# Patient Record
Sex: FEMALE | Race: WHITE | NOT HISPANIC OR LATINO | Employment: UNEMPLOYED | URBAN - NONMETROPOLITAN AREA
[De-identification: names, ages, dates, MRNs, and addresses within clinical notes are randomized per-mention and may not be internally consistent; named-entity substitution may affect disease eponyms.]

---

## 2023-10-11 LAB — PAP RECOMMENDATION EXT: NORMAL

## 2024-01-18 ENCOUNTER — OFFICE VISIT (OUTPATIENT)
Dept: FAMILY MEDICINE | Facility: CLINIC | Age: 29
End: 2024-01-18
Payer: COMMERCIAL

## 2024-01-18 VITALS
HEART RATE: 77 BPM | TEMPERATURE: 99 F | OXYGEN SATURATION: 97 % | RESPIRATION RATE: 20 BRPM | BODY MASS INDEX: 23.32 KG/M2 | SYSTOLIC BLOOD PRESSURE: 120 MMHG | WEIGHT: 118.81 LBS | HEIGHT: 60 IN | DIASTOLIC BLOOD PRESSURE: 78 MMHG

## 2024-01-18 DIAGNOSIS — R59.9 SWOLLEN LYMPH NODES: ICD-10-CM

## 2024-01-18 DIAGNOSIS — H65.192 OTHER NON-RECURRENT ACUTE NONSUPPURATIVE OTITIS MEDIA OF LEFT EAR: Primary | ICD-10-CM

## 2024-01-18 DIAGNOSIS — B37.31 VAGINAL CANDIDIASIS: ICD-10-CM

## 2024-01-18 DIAGNOSIS — H65.93 MIDDLE EAR EFFUSION, BILATERAL: ICD-10-CM

## 2024-01-18 LAB
CTP QC/QA: YES
FLUAV AG NPH QL: NEGATIVE
FLUBV AG NPH QL: NEGATIVE
S PYO RRNA THROAT QL PROBE: NEGATIVE
SARS-COV-2 AG RESP QL IA.RAPID: NEGATIVE

## 2024-01-18 PROCEDURE — 87880 STREP A ASSAY W/OPTIC: CPT | Mod: QW,,, | Performed by: NURSE PRACTITIONER

## 2024-01-18 PROCEDURE — 3008F BODY MASS INDEX DOCD: CPT | Mod: ,,, | Performed by: NURSE PRACTITIONER

## 2024-01-18 PROCEDURE — 87426 SARSCOV CORONAVIRUS AG IA: CPT | Mod: QW,,, | Performed by: NURSE PRACTITIONER

## 2024-01-18 PROCEDURE — 87804 INFLUENZA ASSAY W/OPTIC: CPT | Mod: QW,,, | Performed by: NURSE PRACTITIONER

## 2024-01-18 PROCEDURE — 3074F SYST BP LT 130 MM HG: CPT | Mod: ,,, | Performed by: NURSE PRACTITIONER

## 2024-01-18 PROCEDURE — 3078F DIAST BP <80 MM HG: CPT | Mod: ,,, | Performed by: NURSE PRACTITIONER

## 2024-01-18 PROCEDURE — 99214 OFFICE O/P EST MOD 30 MIN: CPT | Mod: ,,, | Performed by: NURSE PRACTITIONER

## 2024-01-18 PROCEDURE — 1159F MED LIST DOCD IN RCRD: CPT | Mod: ,,, | Performed by: NURSE PRACTITIONER

## 2024-01-18 PROCEDURE — 1160F RVW MEDS BY RX/DR IN RCRD: CPT | Mod: ,,, | Performed by: NURSE PRACTITIONER

## 2024-01-18 RX ORDER — AMOXICILLIN 500 MG/1
500 TABLET, FILM COATED ORAL EVERY 12 HOURS
Qty: 20 TABLET | Refills: 0 | Status: SHIPPED | OUTPATIENT
Start: 2024-01-18 | End: 2024-01-28

## 2024-01-18 RX ORDER — FLUCONAZOLE 150 MG/1
150 TABLET ORAL DAILY
Qty: 1 TABLET | Refills: 0 | Status: SHIPPED | OUTPATIENT
Start: 2024-01-18 | End: 2024-01-19

## 2024-01-18 NOTE — ASSESSMENT & PLAN NOTE
Amoxicillin prescribed to take as directed. Instructed to take all abx until gone even if start to feel better.    Instructed may alternate Tylenol/Motrin for pain/fever if not contraindicated.    Instructed to RTC for any new/worsening/persisting ssx.

## 2024-01-18 NOTE — PROGRESS NOTES
"   LOUISA Elaine   [facility]  [address]  [place] [address]     PATIENT NAME: Lianne Severino  : 1995  DATE: 24  MRN: [id number]      Billing Provider: LOUISA Elaine  Level of Service: MO OFFICE/OUTPT VISIT, EST, LEVL IV, 30-39 MIN  Patient PCP Information       Provider PCP Type    LOUISA [facility]            Reason for Visit / Chief Complaint: Adenopathy (Started this morning ), Headache, Otalgia, and Diarrhea   Health Maintenance Due   Topic Date Due    Hepatitis C Screening  Never done    COVID-19 Vaccine (1) Never done    Pneumococcal Vaccines (Age 0-64) (1 - PCV) Never done    HPV Vaccines (1 - 2-dose series) Never done    TETANUS VACCINE  Never done    Pap Smear  2023          History of Present Illness / Problem Focused Workflow     Lianne Severino presents to the clinic with swollen lymph nodes under neck, earaches (more left than right), headaches, and slight diarrhea. She denies fever, cough, sore throat, abd pain, n/v. She has taken Tylenol this AM that has helped. Provider asked about pt's foot as there was a MRI order placed in December but does not look like pt had done. She states her foot actually started feeling better when she left clinic so she did not have MRI done.  She denies any other needs at this time. NAD noted.     No results found for: "HGBA1C"     CMP  Sodium   Date Value Ref Range Status   2023 136 136 - 145 mmol/L Final     Potassium   Date Value Ref Range Status   2023 4.9 3.5 - 5.1 mmol/L Final     Chloride   Date Value Ref Range Status   2023 104 98 - 107 mmol/L Final     CO2   Date Value Ref Range Status   2023 27 21 - 32 mmol/L Final     Glucose   Date Value Ref Range Status   2023 91 74 - 106 mg/dL Final     BUN   Date Value Ref Range Status   2023 13 7 - 18 mg/dL Final     Creatinine   Date Value Ref Range Status   2023 0.73 0.55 - 1.02 mg/dL Final     Calcium   Date Value Ref Range " Status   08/23/2023 8.9 8.5 - 10.1 mg/dL Final     Anion Gap   Date Value Ref Range Status   08/23/2023 10 7 - 16 mmol/L Final     eGFR   Date Value Ref Range Status   08/23/2023 116 >=60 mL/min/1.73m2 Final        Lab Results   Component Value Date    WBC 8.04 08/23/2023    RBC 4.43 08/23/2023    HGB 14.5 08/23/2023    HCT 42.1 08/23/2023    MCV 95.0 08/23/2023    MCH 32.7 (H) 08/23/2023    MCHC 34.4 08/23/2023    RDW 11.8 08/23/2023     08/23/2023    MPV 9.4 08/23/2023    LYMPH 34.3 08/23/2023    LYMPH 2.76 08/23/2023    MONO 5.6 08/23/2023    EOS 0.28 08/23/2023    BASO 0.04 08/23/2023    EOSINOPHIL 3.5 08/23/2023    BASOPHIL 0.5 08/23/2023        Lab Results   Component Value Date    CHOL 156 08/23/2023     Lab Results   Component Value Date    HDL 63 (H) 08/23/2023     Lab Results   Component Value Date    LDLCALC 77 08/23/2023     Lab Results   Component Value Date    TRIG 79 08/23/2023     Lab Results   Component Value Date    CHOLHDL 2.5 08/23/2023        Wt Readings from Last 3 Encounters:   01/18/24 1605 53.9 kg (118 lb 12.8 oz)   12/04/23 1601 52.8 kg (116 lb 6.4 oz)   11/14/23 0849 52 kg (114 lb 9.6 oz)        BP Readings from Last 3 Encounters:   01/18/24 120/78   12/04/23 117/79   11/14/23 107/72        Review of Systems     Review of Systems   Constitutional: Negative.    HENT:  Positive for ear pain. Negative for nasal congestion, ear discharge, postnasal drip, rhinorrhea, sinus pressure/congestion and sore throat.    Eyes: Negative.    Respiratory: Negative.     Cardiovascular: Negative.    Gastrointestinal:  Positive for diarrhea.   Endocrine: Negative.    Genitourinary: Negative.    Musculoskeletal: Negative.    Integumentary:  Negative.   Allergic/Immunologic: Negative.    Neurological:  Positive for headaches.   Hematological: Negative.    Psychiatric/Behavioral: Negative.          Medical / Social / Family History     Past Medical History:   Diagnosis Date    Foot injury, left, initial  encounter 11/14/2023    Left foot pain 11/14/2023    She fell off of the porch and wristed the top of her foot whens he fell.   Xray ordered. Pending results.   Rest, Ice, Compression, Elevation  IBPROFEN 800mg as needed  Wrapped foot for compression with ace wrap        Past Surgical History:   Procedure Laterality Date    EXCISION OF LESION OF EYELID      stye removal       Social History  Ms.  reports that she has been smoking vaping with nicotine. She has never been exposed to tobacco smoke. She has never used smokeless tobacco. She reports that she does not currently use alcohol after a past usage of about 1.0 standard drink of alcohol per week. She reports that she does not use drugs.    Family History  Ms.'s family history includes COPD in her maternal grandmother; Heart attack in her maternal uncle; Hypertension in her father and mother; Lung cancer in her maternal grandfather and paternal grandfather; No Known Problems in her paternal grandmother.    Medications and Allergies     Medications  Outpatient Medications Marked as Taking for the 1/18/24 encounter (Office Visit) with Ekta Yuen FNP   Medication Sig Dispense Refill    FLUoxetine 20 MG capsule Take 1 capsule (20 mg total) by mouth once daily. 90 capsule 1    multivitamin capsule Take 1 capsule by mouth once daily.         Allergies  Review of patient's allergies indicates:   Allergen Reactions    Clindamycin Nausea And Vomiting       Physical Examination     Vitals:    01/18/24 1605   BP: 120/78   BP Location: Left arm   Patient Position: Sitting   BP Method: Medium (Automatic)   Pulse: 77   Resp: 20   Temp: 98.5 °F (36.9 °C)   TempSrc: Oral   SpO2: 97%   Weight: 53.9 kg (118 lb 12.8 oz)   Height: 5' (1.524 m)      Physical Exam  Constitutional:       Appearance: Normal appearance.   HENT:      Head: Normocephalic.      Right Ear: A middle ear effusion is present.      Left Ear: A middle ear effusion is present. Tympanic membrane is erythematous.    Eyes:      Extraocular Movements: Extraocular movements intact.   Cardiovascular:      Rate and Rhythm: Normal rate and regular rhythm.      Pulses: Normal pulses.      Heart sounds: Normal heart sounds.   Pulmonary:      Effort: Pulmonary effort is normal.      Breath sounds: Normal breath sounds.   Abdominal:      General: Abdomen is flat. Bowel sounds are normal.      Palpations: Abdomen is soft.   Lymphadenopathy:      Head:      Right side of head: Submandibular adenopathy present.      Left side of head: Submandibular adenopathy present.   Skin:     General: Skin is warm and dry.      Capillary Refill: Capillary refill takes less than 2 seconds.   Neurological:      General: No focal deficit present.      Mental Status: She is alert and oriented to person, place, and time.   Psychiatric:         Mood and Affect: Mood normal.         Behavior: Behavior normal.          Assessment and Plan (including Health Maintenance)   :    Plan:     There are no Patient Instructions on file for this visit.       Health Maintenance Due   Topic Date Due    Hepatitis C Screening  Never done    COVID-19 Vaccine (1) Never done    Pneumococcal Vaccines (Age 0-64) (1 - PCV) Never done    HPV Vaccines (1 - 2-dose series) Never done    TETANUS VACCINE  Never done    Pap Smear  09/23/2023       Problem List Items Addressed This Visit       Left otitis media - Primary    Current Assessment & Plan     Amoxicillin prescribed to take as directed. Instructed to take all abx until gone even if start to feel better.    Instructed may alternate Tylenol/Motrin for pain/fever if not contraindicated.    Instructed to RTC for any new/worsening/persisting ssx.           Relevant Medications    amoxicillin (AMOXIL) 500 MG Tab    Middle ear effusion, bilateral    Current Assessment & Plan     Provider offered steroid injection but pt states she has had this before and made her heart race so she declines this today.          Swollen lymph nodes     Current Assessment & Plan     Flu, COVID, Strep, negative.   See above plan.   Instructed may alternate Tylenol/Motrin for pain/fever if not contraindicated.    Reassured pt lymph node swelling will be better after completing abx. Pt vu.   Instructed to RTC for any new/worsening/persisting ssx.             Relevant Orders    SARS Coronavirus 2 Antigen, POCT (Completed)    POCT Influenza A/B Rapid Antigen (Completed)    POCT rapid strep A (Completed)    Vaginal candidiasis    Current Assessment & Plan     Pt states she has had yeast infection with abx before and request Diflucan sent in as well.          Relevant Medications    fluconazole (DIFLUCAN) 150 MG Tab     Other non-recurrent acute nonsuppurative otitis media of left ear  -     amoxicillin (AMOXIL) 500 MG Tab; Take 1 tablet (500 mg total) by mouth every 12 (twelve) hours. for 10 days  Dispense: 20 tablet; Refill: 0    Swollen lymph nodes  -     SARS Coronavirus 2 Antigen, POCT  -     POCT Influenza A/B Rapid Antigen  -     POCT rapid strep A    Vaginal candidiasis  -     fluconazole (DIFLUCAN) 150 MG Tab; Take 1 tablet (150 mg total) by mouth once daily. for 1 day  Dispense: 1 tablet; Refill: 0    Middle ear effusion, bilateral       The patient has no Health Maintenance topics of status Not Due      Future Appointments   Date Time Provider Department Center   4/25/2024  9[facility]20 [facility]   8/29/2024 10:40 [facility]        Follow up if symptoms worsen or fail to improve.    Health Maintenance Due   Topic Date Due    Hepatitis C Screening  Never done    COVID-19 Vaccine (1) Never done    Pneumococcal Vaccines (Age 0-64) (1 - PCV) Never done    HPV Vaccines (1 - 2-dose series) Never done    TETANUS VACCINE  Never done    Pap Smear  09/23/2023        Signature:  LOUISA Elaine    Date of encounter: 1/18/24

## 2024-01-18 NOTE — ASSESSMENT & PLAN NOTE
Flu, COVID, Strep, negative.   See above plan.   Instructed may alternate Tylenol/Motrin for pain/fever if not contraindicated.    Reassured pt lymph node swelling will be better after completing abx. Pt vu.   Instructed to RTC for any new/worsening/persisting ssx.

## 2024-01-18 NOTE — ASSESSMENT & PLAN NOTE
Provider offered steroid injection but pt states she has had this before and made her heart race so she declines this today.

## 2024-02-06 ENCOUNTER — HOSPITAL ENCOUNTER (EMERGENCY)
Facility: HOSPITAL | Age: 29
Discharge: HOME | End: 2024-02-06
Payer: COMMERCIAL

## 2024-02-06 VITALS
RESPIRATION RATE: 17 BRPM | TEMPERATURE: 99 F | OXYGEN SATURATION: 100 % | HEIGHT: 60 IN | DIASTOLIC BLOOD PRESSURE: 55 MMHG | SYSTOLIC BLOOD PRESSURE: 99 MMHG | BODY MASS INDEX: 22.58 KG/M2 | WEIGHT: 115 LBS | HEART RATE: 87 BPM

## 2024-02-06 DIAGNOSIS — K52.9 GASTROENTERITIS: Primary | ICD-10-CM

## 2024-02-06 LAB
ALBUMIN SERPL BCP-MCNC: 3.8 G/DL (ref 3.5–5)
ALBUMIN/GLOB SERPL: 1 {RATIO}
ALP SERPL-CCNC: 90 U/L (ref 37–98)
ALT SERPL W P-5'-P-CCNC: 44 U/L (ref 13–56)
ANION GAP SERPL CALCULATED.3IONS-SCNC: 13 MMOL/L (ref 7–16)
AST SERPL W P-5'-P-CCNC: 34 U/L (ref 15–37)
BASOPHILS # BLD AUTO: 0 K/UL (ref 0–0.2)
BASOPHILS NFR BLD AUTO: 0 % (ref 0–1)
BILIRUB SERPL-MCNC: 0.5 MG/DL (ref ?–1.2)
BUN SERPL-MCNC: 13 MG/DL (ref 7–18)
BUN/CREAT SERPL: 17 (ref 6–20)
CALCIUM SERPL-MCNC: 8.8 MG/DL (ref 8.5–10.1)
CHLORIDE SERPL-SCNC: 101 MMOL/L (ref 98–107)
CO2 SERPL-SCNC: 26 MMOL/L (ref 21–32)
CREAT SERPL-MCNC: 0.75 MG/DL (ref 0.55–1.02)
DIFFERENTIAL METHOD BLD: ABNORMAL
EGFR (NO RACE VARIABLE) (RUSH/TITUS): 113 ML/MIN/1.73M2
EOSINOPHIL # BLD AUTO: 0.07 K/UL (ref 0–0.5)
EOSINOPHIL NFR BLD AUTO: 1.4 % (ref 1–4)
ERYTHROCYTE [DISTWIDTH] IN BLOOD BY AUTOMATED COUNT: 12.2 % (ref 11.5–14.5)
FLUAV AG UPPER RESP QL IA.RAPID: NEGATIVE
FLUBV AG UPPER RESP QL IA.RAPID: NEGATIVE
GLOBULIN SER-MCNC: 3.7 G/DL (ref 2–4)
GLUCOSE SERPL-MCNC: 90 MG/DL (ref 74–106)
HCT VFR BLD AUTO: 43.3 % (ref 38–47)
HGB BLD-MCNC: 14.3 G/DL (ref 12–16)
LACTATE SERPL-SCNC: 0.6 MMOL/L (ref 0.4–2)
LYMPHOCYTES # BLD AUTO: 0.47 K/UL (ref 1–4.8)
LYMPHOCYTES NFR BLD AUTO: 9.7 % (ref 27–41)
MCH RBC QN AUTO: 31.4 PG (ref 27–31)
MCHC RBC AUTO-ENTMCNC: 33 G/DL (ref 32–36)
MCV RBC AUTO: 95.2 FL (ref 80–96)
MONOCYTES # BLD AUTO: 0.2 K/UL (ref 0–0.8)
MONOCYTES NFR BLD AUTO: 4.1 % (ref 2–6)
MPC BLD CALC-MCNC: 9 FL (ref 9.4–12.4)
NEUTROPHILS # BLD AUTO: 4.09 K/UL (ref 1.8–7.7)
NEUTROPHILS NFR BLD AUTO: 84.8 % (ref 53–65)
PLATELET # BLD AUTO: 204 K/UL (ref 150–400)
POTASSIUM SERPL-SCNC: 3.4 MMOL/L (ref 3.5–5.1)
PROT SERPL-MCNC: 7.5 G/DL (ref 6.4–8.2)
RBC # BLD AUTO: 4.55 M/UL (ref 4.2–5.4)
SARS-COV-2 RDRP RESP QL NAA+PROBE: NEGATIVE
SODIUM SERPL-SCNC: 137 MMOL/L (ref 136–145)
WBC # BLD AUTO: 4.83 K/UL (ref 4.5–11)

## 2024-02-06 PROCEDURE — 96374 THER/PROPH/DIAG INJ IV PUSH: CPT

## 2024-02-06 PROCEDURE — 85025 COMPLETE CBC W/AUTO DIFF WBC: CPT | Performed by: NURSE PRACTITIONER

## 2024-02-06 PROCEDURE — 87804 INFLUENZA ASSAY W/OPTIC: CPT | Performed by: NURSE PRACTITIONER

## 2024-02-06 PROCEDURE — 99284 EMERGENCY DEPT VISIT MOD MDM: CPT | Mod: 25

## 2024-02-06 PROCEDURE — 99284 EMERGENCY DEPT VISIT MOD MDM: CPT | Mod: ,,, | Performed by: NURSE PRACTITIONER

## 2024-02-06 PROCEDURE — 83605 ASSAY OF LACTIC ACID: CPT | Performed by: NURSE PRACTITIONER

## 2024-02-06 PROCEDURE — 87635 SARS-COV-2 COVID-19 AMP PRB: CPT | Performed by: NURSE PRACTITIONER

## 2024-02-06 PROCEDURE — 80053 COMPREHEN METABOLIC PANEL: CPT | Performed by: NURSE PRACTITIONER

## 2024-02-06 PROCEDURE — 96361 HYDRATE IV INFUSION ADD-ON: CPT

## 2024-02-06 PROCEDURE — 63600175 PHARM REV CODE 636 W HCPCS: Performed by: NURSE PRACTITIONER

## 2024-02-06 PROCEDURE — 25000003 PHARM REV CODE 250: Performed by: NURSE PRACTITIONER

## 2024-02-06 RX ORDER — ONDANSETRON 4 MG/1
4 TABLET, ORALLY DISINTEGRATING ORAL EVERY 6 HOURS PRN
Qty: 20 TABLET | Refills: 0 | Status: SHIPPED | OUTPATIENT
Start: 2024-02-06

## 2024-02-06 RX ORDER — ONDANSETRON HYDROCHLORIDE 2 MG/ML
4 INJECTION, SOLUTION INTRAVENOUS
Status: COMPLETED | OUTPATIENT
Start: 2024-02-06 | End: 2024-02-06

## 2024-02-06 RX ADMIN — ONDANSETRON 4 MG: 2 INJECTION INTRAMUSCULAR; INTRAVENOUS at 12:02

## 2024-02-06 RX ADMIN — SODIUM CHLORIDE 1000 ML: 9 INJECTION, SOLUTION INTRAVENOUS at 12:02

## 2024-02-06 NOTE — ED PROVIDER NOTES
Encounter Date: 2/6/2024       History     Chief Complaint   Patient presents with    Influenza    COVID-19 Concerns     29 y/o WF arrived to the ED via POV with complaint of feeling feverish, headache, nausea, vomiting and diarrhea that started yesterday. Vomited x 3 yesterday, none today, but continues to be nauseated today. Had too numerous to count loose watery stools since yesterday. Denies blood in bm. Feels weak and dizzy today. Denies abdominal pain or dysuria. Has been able to keep down liquids today. Took Tylenol and Imodium AD OTC this morning at 08:00 am which has helped to slow down diarrhea, but has not resolved.   Denies sick contacts.    The history is provided by the patient.     Review of patient's allergies indicates:   Allergen Reactions    Clindamycin Nausea And Vomiting     Past Medical History:   Diagnosis Date    Foot injury, left, initial encounter 11/14/2023    Left foot pain 11/14/2023    She fell off of the porch and wristed the top of her foot whens he fell.   Xray ordered. Pending results.   Rest, Ice, Compression, Elevation  IBPROFEN 800mg as needed  Wrapped foot for compression with ace wrap      Past Surgical History:   Procedure Laterality Date    EXCISION OF LESION OF EYELID      stye removal     Family History   Problem Relation Age of Onset    Hypertension Mother     Hypertension Father     Heart attack Maternal Uncle     COPD Maternal Grandmother     Lung cancer Maternal Grandfather     No Known Problems Paternal Grandmother     Lung cancer Paternal Grandfather      Social History     Tobacco Use    Smoking status: Every Day     Types: Vaping with nicotine     Passive exposure: Never    Smokeless tobacco: Never   Substance Use Topics    Alcohol use: Not Currently     Alcohol/week: 1.0 standard drink of alcohol     Types: 1 Glasses of wine per week     Comment: rarely    Drug use: Never     Review of Systems   Constitutional:  Positive for appetite change, chills, fatigue and  fever. Negative for activity change.   HENT: Negative.     Eyes: Negative.    Respiratory: Negative.     Cardiovascular: Negative.    Gastrointestinal:  Positive for diarrhea, nausea and vomiting. Negative for abdominal pain and constipation.   Genitourinary:  Negative for dysuria, frequency and hematuria.   Musculoskeletal: Negative.    Skin: Negative.    Neurological:  Positive for dizziness and weakness. Negative for syncope, light-headedness and headaches.   Hematological: Negative.    Psychiatric/Behavioral: Negative.         Physical Exam     Initial Vitals [02/06/24 1155]   BP Pulse Resp Temp SpO2   124/76 102 16 98.5 °F (36.9 °C) 96 %      MAP       --         Physical Exam    Nursing note and vitals reviewed.  Constitutional: She appears well-developed and well-nourished. She is not diaphoretic. She is cooperative.  Non-toxic appearance. She does not have a sickly appearance. She appears ill. No distress.   HENT:   Head: Normocephalic and atraumatic.   Right Ear: External ear normal.   Left Ear: External ear normal.   Nose: Nose normal.   Mouth/Throat: Uvula is midline and oropharynx is clear and moist. Mucous membranes are dry.   Eyes: Pupils are equal, round, and reactive to light.   Neck: Neck supple.   Normal range of motion.  Cardiovascular:  Regular rhythm, normal heart sounds, intact distal pulses and normal pulses.   Tachycardia present.         Pulmonary/Chest: Effort normal and breath sounds normal.   Abdominal: Abdomen is soft and flat. Bowel sounds are normal. There is no abdominal tenderness.   Musculoskeletal:         General: Normal range of motion.      Cervical back: Normal range of motion and neck supple.     Lymphadenopathy:     She has no cervical adenopathy.   Neurological: She is alert and oriented to person, place, and time. She has normal strength.   Skin: Skin is warm, dry and intact. Capillary refill takes less than 2 seconds. No rash noted.   Psychiatric: She has a normal mood and  affect. Her speech is normal and behavior is normal. Judgment and thought content normal.         Medical Screening Exam   See Full Note    ED Course   Procedures  Labs Reviewed   COMPREHENSIVE METABOLIC PANEL - Abnormal; Notable for the following components:       Result Value    Potassium 3.4 (*)     All other components within normal limits   CBC WITH DIFFERENTIAL - Abnormal; Notable for the following components:    MCH 31.4 (*)     MPV 9.0 (*)     Neutrophils % 84.8 (*)     Lymphocytes % 9.7 (*)     Lymphocytes, Absolute 0.47 (*)     All other components within normal limits   RAPID INFLUENZA A/B - Normal   LACTIC ACID, PLASMA - Normal   SARS-COV-2 RNA AMPLIFICATION, QUAL - Normal    Narrative:     Negative SARS-CoV results should not be used as the sole basis for treatment or patient management decisions; negative results should be considered in the context of a patient's recent exposures, history and the presene of clinical signs and symptoms consistent with COVID-19.  Negative results should be treated as presumptive and confirmed by molecular assay, if necessary for patient management.   CBC W/ AUTO DIFFERENTIAL    Narrative:     The following orders were created for panel order CBC auto differential.  Procedure                               Abnormality         Status                     ---------                               -----------         ------                     CBC with Differential[[id number]]       Abnormal            Final result                 Please view results for these tests on the individual orders.          Imaging Results    None          Medications   sodium chloride 0.9% bolus 1,000 mL 1,000 mL (1,000 mLs Intravenous New Bag 2/6/24 1258)   sodium chloride 0.9% bolus 1,000 mL 1,000 mL (0 mLs Intravenous Stopped 2/6/24 1256)   ondansetron injection 4 mg (4 mg Intravenous Given 2/6/24 1218)     Medical Decision Making  29 y/o WF arrived to the ED via POV with complaint of feeling  feverish, headache, nausea, vomiting and diarrhea that started yesterday. Vomited x 3 yesterday, none today, but continues to be nauseated today. Had too numerous to count loose watery stools since yesterday. Denies blood in bm. Feels weak and dizzy today. Denies abdominal pain or dysuria. Has been able to keep down liquids today. Took Tylenol and Imodium AD OTC this morning at 08:00 am which has helped to slow down diarrhea, but has not resolved.   Denies sick contacts.    Amount and/or Complexity of Data Reviewed  Labs: ordered.     Details: Labs Reviewed  COMPREHENSIVE METABOLIC PANEL - Abnormal; Notable for the following components:     Potassium                     3.4 (*)             All other components within normal limits  CBC WITH DIFFERENTIAL - Abnormal; Notable for the following components:     MCH                           31.4 (*)               MPV                           9.0 (*)                Neutrophils %                 84.8 (*)               Lymphocytes %                 9.7 (*)                Lymphocytes, Absolute         0.47 (*)            All other components within normal limits  RAPID INFLUENZA A/B - Normal  LACTIC ACID, PLASMA - Normal  SARS-COV-2 RNA AMPLIFICATION, QUAL - Normal         Narrative: Negative SARS-CoV results should not be used as the sole basis for treatment or patient management decisions; negative results should be considered in the context of a patient's recent exposures, history and the presene of clinical signs and symptoms consistent with COVID-19.  Negative results should be treated as presumptive and confirmed by molecular assay, if necessary for patient management.  CBC W/ AUTO DIFFERENTIAL   Discussion of management or test interpretation with external provider(s): Discharge MDM  I discussed lab results with patient   Patient was managed in the ED with:  -NS bolus IV fluids x 2  -Zofran 4 mg IV x 1    The response to treatment was tolerated well, reports feeling  better. Reviewed discharge instructions with patient. She will follow up with PCP if symptoms do not improve.    Patient was discharged in stable condition.  Detailed return precautions discussed. Patient agreed to treatment plan and verbalized understanding.    Risk  Prescription drug management.                                      Clinical Impression:   Final diagnoses:  [K52.9] Gastroenteritis (Primary)               Inna Ron, P  02/06/24 1325

## 2024-02-06 NOTE — DISCHARGE INSTRUCTIONS
-Clear liquids x 12 hours. NO purple or red drinks or foods  -If tolerating clear liquids after 12 hours may advance to soft bland diet  -Zofran as directed for nausea and vomiting  -Imodium AD over the counter as directed for diarrhea

## 2024-02-06 NOTE — ED TRIAGE NOTES
Presents with c/o fever, vomiting, diarrhea and headache since last night.  States is having diarrhea about every 15 minutes.  Vomited x 3 last night.  Took Immodium and Tylenol about 8 am and that has helped.  Feels like she is dehydrated.  States weak and dizzy with position changes.

## 2024-02-12 ENCOUNTER — TELEPHONE (OUTPATIENT)
Dept: EMERGENCY MEDICINE | Facility: HOSPITAL | Age: 29
End: 2024-02-12
Payer: COMMERCIAL

## 2024-06-21 ENCOUNTER — OFFICE VISIT (OUTPATIENT)
Dept: FAMILY MEDICINE | Facility: CLINIC | Age: 29
End: 2024-06-21
Payer: COMMERCIAL

## 2024-06-21 VITALS
SYSTOLIC BLOOD PRESSURE: 107 MMHG | RESPIRATION RATE: 18 BRPM | WEIGHT: 113 LBS | HEIGHT: 60 IN | HEART RATE: 69 BPM | OXYGEN SATURATION: 100 % | BODY MASS INDEX: 22.19 KG/M2 | TEMPERATURE: 98 F | DIASTOLIC BLOOD PRESSURE: 69 MMHG

## 2024-06-21 DIAGNOSIS — F41.8 ANXIETY WITH DEPRESSION: Chronic | ICD-10-CM

## 2024-06-21 RX ORDER — FLUOXETINE HYDROCHLORIDE 20 MG/1
20 CAPSULE ORAL DAILY
Qty: 90 CAPSULE | Refills: 1 | Status: SHIPPED | OUTPATIENT
Start: 2024-06-21

## 2024-06-21 NOTE — LETTER
AUTHORIZATION FOR RELEASE OF   CONFIDENTIAL INFORMATION    Dear Dr Schmitt,    We are seeing Lianne Severino, date of birth 1995, in the clinic at [facility]. Ekta Yuen FNP is the patient's PCP. Lianne Severino has an outstanding lab/procedure at the time we reviewed her chart. In order to help keep her health information updated, she has authorized us to request the following medical record(s):        (  )  MAMMOGRAM                                      (  )  COLONOSCOPY      ( XX )  PAP SMEAR                                     (  )  OUTSIDE LAB RESULTS     (  )  DEXA SCAN                                          (  )  EYE EXAM            (  )  FOOT EXAM                                          (  )  ENTIRE RECORD     (  )  OUTSIDE IMMUNIZATIONS                 (  )  _______________         Please fax records to Dianna Watt, [facility], [phone]     If you have any questions, please contact me at [phone].           Patient Name: Lianne Severino  : 1995  Patient Phone #: [phone]

## 2024-06-21 NOTE — PROGRESS NOTES
[facility]    Dianna Watt AGPCNP-BC [facility] [address] [address]  [phone]          PATIENT NAME: Lianne Severino  : 1995  DATE: 24  MRN: [id number]          Reason for Visit        Chief Complaint   Patient presents with    Depression     She is here for a refill on her prozac medication    Health Maintenance     Hepatitis C Screening Never done-declined  Pneumococcal Vaccines (Age 0-64)(1 of 2 - PCV) Never done  TETANUS VACCINE Never done-dec  COVID-19 Vaccine( - - season) Never done-dec  Pap Smear due on 2023-2023 Dr [facility]         History of Present Illness      Lianne Severino is a 29 y.o. female with chronic conditions of anxiety and depression who presents today for follow up and medication refills. She reports doing good tolerating her medication without any problems. 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 She declines all recommended vaccines and understands the deleterious effects of doing so. She reports having her last pap with Dr. Galicia in [place], [place] will request these records.       MEDICAL / SURGICAL / SOCIAL HISTORY     Past Medical History:   Diagnosis Date    Depression     Foot injury, left, initial encounter 2023    Left foot pain 2023    She fell off of the porch and wristed the top of her foot whens he fell.   Xray ordered. Pending results.   Rest, Ice, Compression, Elevation  IBPROFEN 800mg as needed  Wrapped foot for compression with ace wrap        Past Surgical History:   Procedure Laterality Date    EXCISION OF LESION OF EYELID      stye removal       Social History     Tobacco Use    Smoking status: Every Day     Types: Vaping with nicotine     Passive exposure: Never    Smokeless tobacco: Never   Substance Use Topics    Alcohol use: Not Currently     Alcohol/week: 1.0  standard drink of alcohol     Types: 1 Glasses of wine per week     Comment: rarely    Drug use: Never         I personally reviewed all past medical, surgical, and social.     Review of Systems   Constitutional:  Negative for chills and fever.   HENT:  Negative for congestion, hearing loss, rhinorrhea and sore throat.    Eyes:  Negative for visual disturbance.   Respiratory:  Negative for cough and shortness of breath.    Cardiovascular:  Negative for chest pain.   Gastrointestinal:  Negative for abdominal pain, diarrhea and nausea.   Genitourinary:  Negative for dysuria, frequency and urgency.   Musculoskeletal:  Negative for arthralgias, back pain, myalgias and neck pain.   Neurological:  Negative for dizziness and headaches.   Psychiatric/Behavioral:  Positive for depression. Negative for dysphoric mood and sleep disturbance. The patient is not nervous/anxious.         MEDICATIONS / ALLERGIES / HM     Current Outpatient Medications   Medication Sig Dispense Refill    multivitamin capsule Take 1 capsule by mouth once daily.      FLUoxetine 20 MG capsule Take 1 capsule (20 mg total) by mouth once daily. 90 capsule 1     No current facility-administered medications for this visit.       Review of patient's allergies indicates:   Allergen Reactions    Clindamycin Nausea And Vomiting         There is no immunization history on file for this patient.     Health Maintenance   Topic Date Due    Hepatitis C Screening  Never done-declined     TETANUS VACCINE  Never done-declined     Pap Smear  09/23/2023-KATEY sent to Dr. Schmitt     Lipid Panel  Completed        Physical Exam      Vital Signs  Temp: 98.2 °F (36.8 °C)  Temp Source: Oral  Pulse: 69  Resp: 18  SpO2: 100 %  BP: 107/69  BP Location: Right arm  Patient Position: Sitting  Pain Score: 0-No pain  Height and Weight  Height: 5' (152.4 cm)  Weight: 51.3 kg (113 lb)  BSA (Calculated - sq m): 1.47 sq meters  BMI (Calculated): 22.1  Weight in (lb) to have BMI = 25:  "127.7]    Physical Exam  Constitutional:       General: She is not in acute distress.  HENT:      Head: Normocephalic.      Right Ear: External ear normal.      Left Ear: External ear normal.   Cardiovascular:      Rate and Rhythm: Normal rate and regular rhythm.      Pulses: Normal pulses.      Heart sounds: Normal heart sounds.   Pulmonary:      Effort: Pulmonary effort is normal.      Breath sounds: Normal breath sounds.   Abdominal:      Palpations: Abdomen is soft.      Tenderness: There is no abdominal tenderness.   Skin:     General: Skin is warm and dry.   Neurological:      Mental Status: She is alert and oriented to person, place, and time.   Psychiatric:         Mood and Affect: Mood normal.         Behavior: Behavior normal.          Laboratory:    Lab Results   Component Value Date    GLU 90 02/06/2024     02/06/2024    K 3.4 (L) 02/06/2024     02/06/2024    CO2 26 02/06/2024    BUN 13 02/06/2024    CREATININE 0.75 02/06/2024    CALCIUM 8.8 02/06/2024    PROT 7.5 02/06/2024    ALBUMIN 3.8 02/06/2024    BILITOT 0.5 02/06/2024    ALKPHOS 90 02/06/2024    AST 34 02/06/2024    ALT 44 02/06/2024    ANIONGAP 13 02/06/2024       Lab Results   Component Value Date    WBC 4.83 02/06/2024    RBC 4.55 02/06/2024    HGB 14.3 02/06/2024    HCT 43.3 02/06/2024    MCV 95.2 02/06/2024    RDW 12.2 02/06/2024     02/06/2024        Lab Results   Component Value Date    CHOL 156 08/23/2023    TRIG 79 08/23/2023    HDL 63 (H) 08/23/2023    LDLCALC 77 08/23/2023       No results found for: "TSH"    No results found for: "HGBA1C", "ESTIMATEDAVG"     No results found for: "EWTJLWBJ61"    No results found for: "UIICVJSZ29PV"          Point Of Care Testing:    No results found for: "WBCUR", "NITRITE", "UROBILINOGEN", "PROTEINPOC", "PHUR", "BLOODUR", "SPECGRAV", "KETONESU", "BILIRUBINPOC", "GLUCOSEUR"    No results found for: "[id number]", "FLUAMOLEC", "FLUBMOLEC", "MOLSTREPAPOC"          Assessment/Plan "     Anxiety with depression  -     FLUoxetine 20 MG capsule; Take 1 capsule (20 mg total) by mouth once daily.  Dispense: 90 capsule; Refill: 1        Future Appointments   Date Time Provider Department Center   8/29/2024 10:40 AM Ekta Yuen FNP [facility]   12/20/2024  1:40 PM Ekta Yuen FNP [facility]       Workup results were reviewed and all questions were answered. Diagnosis and treatment options were discussed and the patient  is amenable with the overall treatment plan. Verbal and written discharge instructions were given including to return to clinic/ED with any acute worsening of symptoms or failure of symptoms to improve. The reasons for return to the clinic/ED were explained in lay terms. No further intervention is warranted at this time. The patient agrees with the plan, expresses understanding, is hemodynamically stable and in no acute distress.     All questions answered to desired level of satisfaction          ROCKY Garcia-BC [facility]

## 2024-07-24 ENCOUNTER — PATIENT OUTREACH (OUTPATIENT)
Facility: HOSPITAL | Age: 29
End: 2024-07-24
Payer: COMMERCIAL

## 2024-09-13 ENCOUNTER — OFFICE VISIT (OUTPATIENT)
Dept: FAMILY MEDICINE | Facility: CLINIC | Age: 29
End: 2024-09-13
Payer: MEDICAID

## 2024-09-13 VITALS
DIASTOLIC BLOOD PRESSURE: 80 MMHG | BODY MASS INDEX: 22.11 KG/M2 | OXYGEN SATURATION: 98 % | SYSTOLIC BLOOD PRESSURE: 115 MMHG | HEART RATE: 69 BPM | WEIGHT: 112.63 LBS | RESPIRATION RATE: 19 BRPM | TEMPERATURE: 98 F | HEIGHT: 60 IN

## 2024-09-13 DIAGNOSIS — Z13.1 SCREENING FOR DIABETES MELLITUS: ICD-10-CM

## 2024-09-13 DIAGNOSIS — F32.0 CURRENT MILD EPISODE OF MAJOR DEPRESSIVE DISORDER, UNSPECIFIED WHETHER RECURRENT: ICD-10-CM

## 2024-09-13 DIAGNOSIS — R53.83 FATIGUE, UNSPECIFIED TYPE: ICD-10-CM

## 2024-09-13 DIAGNOSIS — Z13.220 SCREENING FOR LIPID DISORDERS: ICD-10-CM

## 2024-09-13 DIAGNOSIS — Z00.00 ENCOUNTER FOR GENERAL ADULT MEDICAL EXAMINATION WITHOUT ABNORMAL FINDINGS: Primary | ICD-10-CM

## 2024-09-13 DIAGNOSIS — F41.9 ANXIETY: ICD-10-CM

## 2024-09-13 PROBLEM — B37.31 VAGINAL CANDIDIASIS: Status: RESOLVED | Noted: 2024-01-18 | Resolved: 2024-09-13

## 2024-09-13 PROBLEM — H65.93 MIDDLE EAR EFFUSION, BILATERAL: Status: RESOLVED | Noted: 2024-01-18 | Resolved: 2024-09-13

## 2024-09-13 PROBLEM — H66.92 LEFT OTITIS MEDIA: Status: RESOLVED | Noted: 2024-01-18 | Resolved: 2024-09-13

## 2024-09-13 PROBLEM — R59.9 SWOLLEN LYMPH NODES: Status: RESOLVED | Noted: 2024-01-18 | Resolved: 2024-09-13

## 2024-09-13 LAB
25(OH)D3 SERPL-MCNC: 38.8 NG/ML
ALBUMIN SERPL BCP-MCNC: 4.3 G/DL (ref 3.5–5)
ALBUMIN/GLOB SERPL: 1.3 {RATIO}
ALP SERPL-CCNC: 68 U/L (ref 37–98)
ALT SERPL W P-5'-P-CCNC: 30 U/L (ref 13–56)
ANION GAP SERPL CALCULATED.3IONS-SCNC: 11 MMOL/L (ref 7–16)
AST SERPL W P-5'-P-CCNC: 18 U/L (ref 15–37)
BASOPHILS # BLD AUTO: 0.03 K/UL (ref 0–0.2)
BASOPHILS NFR BLD AUTO: 0.4 % (ref 0–1)
BILIRUB SERPL-MCNC: 0.5 MG/DL (ref ?–1.2)
BUN SERPL-MCNC: 15 MG/DL (ref 7–18)
BUN/CREAT SERPL: 19 (ref 6–20)
CALCIUM SERPL-MCNC: 9.3 MG/DL (ref 8.5–10.1)
CHLORIDE SERPL-SCNC: 105 MMOL/L (ref 98–107)
CHOLEST SERPL-MCNC: 159 MG/DL (ref 0–200)
CHOLEST/HDLC SERPL: 2.1 {RATIO}
CO2 SERPL-SCNC: 26 MMOL/L (ref 21–32)
CREAT SERPL-MCNC: 0.77 MG/DL (ref 0.55–1.02)
DIFFERENTIAL METHOD BLD: ABNORMAL
EGFR (NO RACE VARIABLE) (RUSH/TITUS): 109 ML/MIN/1.73M2
EOSINOPHIL # BLD AUTO: 0.06 K/UL (ref 0–0.5)
EOSINOPHIL NFR BLD AUTO: 0.8 % (ref 1–4)
ERYTHROCYTE [DISTWIDTH] IN BLOOD BY AUTOMATED COUNT: 11.3 % (ref 11.5–14.5)
EST. AVERAGE GLUCOSE BLD GHB EST-MCNC: 97 MG/DL
GLOBULIN SER-MCNC: 3.2 G/DL (ref 2–4)
GLUCOSE SERPL-MCNC: 129 MG/DL (ref 74–106)
HBA1C MFR BLD HPLC: 5 % (ref 4.5–6.6)
HCT VFR BLD AUTO: 42.8 % (ref 38–47)
HDLC SERPL-MCNC: 77 MG/DL (ref 40–60)
HGB BLD-MCNC: 14.4 G/DL (ref 12–16)
IMM GRANULOCYTES # BLD AUTO: 0.02 K/UL (ref 0–0.04)
IMM GRANULOCYTES NFR BLD: 0.3 % (ref 0–0.4)
LDLC SERPL CALC-MCNC: 62 MG/DL
LYMPHOCYTES # BLD AUTO: 2 K/UL (ref 1–4.8)
LYMPHOCYTES NFR BLD AUTO: 26.6 % (ref 27–41)
MCH RBC QN AUTO: 32.1 PG (ref 27–31)
MCHC RBC AUTO-ENTMCNC: 33.6 G/DL (ref 32–36)
MCV RBC AUTO: 95.5 FL (ref 80–96)
MONOCYTES # BLD AUTO: 0.26 K/UL (ref 0–0.8)
MONOCYTES NFR BLD AUTO: 3.5 % (ref 2–6)
MPC BLD CALC-MCNC: 9.5 FL (ref 9.4–12.4)
NEUTROPHILS # BLD AUTO: 5.14 K/UL (ref 1.8–7.7)
NEUTROPHILS NFR BLD AUTO: 68.4 % (ref 53–65)
NONHDLC SERPL-MCNC: 82 MG/DL
NRBC # BLD AUTO: 0 X10E3/UL
NRBC, AUTO (.00): 0 %
PLATELET # BLD AUTO: 302 K/UL (ref 150–400)
POTASSIUM SERPL-SCNC: 3.5 MMOL/L (ref 3.5–5.1)
PROT SERPL-MCNC: 7.5 G/DL (ref 6.4–8.2)
RBC # BLD AUTO: 4.48 M/UL (ref 4.2–5.4)
SODIUM SERPL-SCNC: 138 MMOL/L (ref 136–145)
TRIGL SERPL-MCNC: 102 MG/DL (ref 35–150)
TSH SERPL DL<=0.005 MIU/L-ACNC: 0.61 UIU/ML (ref 0.36–3.74)
VIT B12 SERPL-MCNC: 941 PG/ML (ref 193–986)
VLDLC SERPL-MCNC: 20 MG/DL
WBC # BLD AUTO: 7.51 K/UL (ref 4.5–11)

## 2024-09-13 PROCEDURE — 84443 ASSAY THYROID STIM HORMONE: CPT | Mod: ,,, | Performed by: CLINICAL MEDICAL LABORATORY

## 2024-09-13 PROCEDURE — 80053 COMPREHEN METABOLIC PANEL: CPT | Mod: ,,, | Performed by: CLINICAL MEDICAL LABORATORY

## 2024-09-13 PROCEDURE — 82306 VITAMIN D 25 HYDROXY: CPT | Mod: ,,, | Performed by: CLINICAL MEDICAL LABORATORY

## 2024-09-13 PROCEDURE — 82607 VITAMIN B-12: CPT | Mod: ,,, | Performed by: CLINICAL MEDICAL LABORATORY

## 2024-09-13 PROCEDURE — 80061 LIPID PANEL: CPT | Mod: ,,, | Performed by: CLINICAL MEDICAL LABORATORY

## 2024-09-13 PROCEDURE — 83036 HEMOGLOBIN GLYCOSYLATED A1C: CPT | Mod: ,,, | Performed by: CLINICAL MEDICAL LABORATORY

## 2024-09-13 PROCEDURE — 85025 COMPLETE CBC W/AUTO DIFF WBC: CPT | Mod: ,,, | Performed by: CLINICAL MEDICAL LABORATORY

## 2024-09-13 RX ORDER — FLUOXETINE HYDROCHLORIDE 40 MG/1
40 CAPSULE ORAL DAILY
Qty: 90 CAPSULE | Refills: 3 | Status: SHIPPED | OUTPATIENT
Start: 2024-09-13 | End: 2025-09-13

## 2024-09-13 NOTE — ASSESSMENT & PLAN NOTE
Attend regularly scheduled office visits.  Monitor/keep log of BP outside of clinic.   Take medications as prescribed.   Avoid adding table salt to foods.   Recommend regular physical activity 30 min most days of the week.

## 2024-09-13 NOTE — PROGRESS NOTES
"   LOUISA Elaine   [facility]  [address]  [place] [address]     PATIENT NAME: Lianne Severino  : 1995  DATE: 24  MRN: [id number]      Billing Provider: LOUISA Elaine  Level of Service:   Patient PCP Information       Provider PCP Type    Primary Doctor No General            Reason for Visit / Chief Complaint: Establish Care (Pt is here to EC with provider. She would like to just have a routine check up. Like lab work and all. She would like to go up on her prozac.)   Health Maintenance Due   Topic Date Due    Hepatitis C Screening  Never done    Pneumococcal Vaccines (Age 0-64) (1 of 2 - PCV) Never done    TETANUS VACCINE  Never done    Influenza Vaccine (1) 2024    COVID-19 Vaccine ( - - season) Never done    Pap Smear  10/10/2024          Update PCP  Update Chief Complaint         History of Present Illness / Problem Focused Workflow     Lianne Severino presents to the clinic for checkup. She would like to have routine labs obtained and also complains about excessive fatigue.  She does have regular cycle monthly and last one was end of September. She would also like to increase her Prozac. She denies any other needs at this time. NAD noted.     No results found for: "HGBA1C"     CMP  Sodium   Date Value Ref Range Status   2024 137 136 - 145 mmol/L Final     Potassium   Date Value Ref Range Status   2024 3.4 (L) 3.5 - 5.1 mmol/L Final     Chloride   Date Value Ref Range Status   2024 101 98 - 107 mmol/L Final     CO2   Date Value Ref Range Status   2024 26 21 - 32 mmol/L Final     Glucose   Date Value Ref Range Status   2024 90 74 - 106 mg/dL Final     BUN   Date Value Ref Range Status   2024 13 7 - 18 mg/dL Final     Creatinine   Date Value Ref Range Status   2024 0.75 0.55 - 1.02 mg/dL Final     Calcium   Date Value Ref Range Status   2024 8.8 8.5 - 10.1 mg/dL Final     Total Protein   Date Value Ref Range Status "   02/06/2024 7.5 6.4 - 8.2 g/dL Final     Albumin   Date Value Ref Range Status   02/06/2024 3.8 3.5 - 5.0 g/dL Final     Bilirubin, Total   Date Value Ref Range Status   02/06/2024 0.5 >0.0 - 1.2 mg/dL Final     Alk Phos   Date Value Ref Range Status   02/06/2024 90 37 - 98 U/L Final     AST   Date Value Ref Range Status   02/06/2024 34 15 - 37 U/L Final     ALT   Date Value Ref Range Status   02/06/2024 44 13 - 56 U/L Final     Anion Gap   Date Value Ref Range Status   02/06/2024 13 7 - 16 mmol/L Final     eGFR   Date Value Ref Range Status   02/06/2024 113 >=60 mL/min/1.73m2 Final        Lab Results   Component Value Date    WBC 4.83 02/06/2024    RBC 4.55 02/06/2024    HGB 14.3 02/06/2024    HCT 43.3 02/06/2024    MCV 95.2 02/06/2024    MCH 31.4 (H) 02/06/2024    MCHC 33.0 02/06/2024    RDW 12.2 02/06/2024     02/06/2024    MPV 9.0 (L) 02/06/2024    LYMPH 9.7 (L) 02/06/2024    LYMPH 0.47 (L) 02/06/2024    MONO 4.1 02/06/2024    EOS 0.07 02/06/2024    BASO 0.00 02/06/2024    EOSINOPHIL 1.4 02/06/2024    BASOPHIL 0.0 02/06/2024        Lab Results   Component Value Date    CHOL 156 08/23/2023     Lab Results   Component Value Date    HDL 63 (H) 08/23/2023     Lab Results   Component Value Date    LDLCALC 77 08/23/2023     Lab Results   Component Value Date    TRIG 79 08/23/2023     Lab Results   Component Value Date    CHOLHDL 2.5 08/23/2023        Wt Readings from Last 3 Encounters:   09/13/24 1514 51.1 kg (112 lb 9.6 oz)   06/21/24 1335 51.3 kg (113 lb)   02/06/24 1155 52.2 kg (115 lb)        BP Readings from Last 3 Encounters:   09/13/24 115/80   06/21/24 107/69   02/06/24 (!) 99/55        Review of Systems     Review of Systems   Constitutional:  Positive for fatigue.   Respiratory: Negative.     Cardiovascular: Negative.    Gastrointestinal: Negative.    Endocrine: Negative.    Genitourinary: Negative.    Musculoskeletal: Negative.    Integumentary:  Negative.   Allergic/Immunologic: Negative.     Neurological: Negative.    Hematological: Negative.    Psychiatric/Behavioral:  The patient is nervous/anxious.         Medical / Social / Family History     Past Medical History:   Diagnosis Date    Depression     Foot injury, left, initial encounter 11/14/2023    Left foot pain 11/14/2023    She fell off of the porch and wristed the top of her foot whens he fell.   Xray ordered. Pending results.   Rest, Ice, Compression, Elevation  IBPROFEN 800mg as needed  Wrapped foot for compression with ace wrap     Middle ear effusion, bilateral 01/18/2024    Swollen lymph nodes 01/18/2024    Vaginal candidiasis 01/18/2024       Past Surgical History:   Procedure Laterality Date    EXCISION OF LESION OF EYELID      stye removal       Social History  Ms.  reports that she has been smoking vaping with nicotine. She has never been exposed to tobacco smoke. She has never used smokeless tobacco. She reports that she does not currently use alcohol after a past usage of about 1.0 standard drink of alcohol per week. She reports that she does not use drugs.    Family History  Ms.'s family history includes COPD in her maternal grandmother; Heart attack in her maternal uncle; Hypertension in her father and mother; Lung cancer in her maternal grandfather and paternal grandfather; No Known Problems in her paternal grandmother.    Medications and Allergies     Medications  Outpatient Medications Marked as Taking for the 9/13/24 encounter (Office Visit) with Ekta Yuen FNP   Medication Sig Dispense Refill    [DISCONTINUED] FLUoxetine 20 MG capsule Take 1 capsule (20 mg total) by mouth once daily. 90 capsule 1       Allergies  Review of patient's allergies indicates:   Allergen Reactions    Clindamycin Nausea And Vomiting       Physical Examination     Vitals:    09/13/24 1514   BP: 115/80   BP Location: Right arm   Patient Position: Sitting   BP Method: Medium (Automatic)   Pulse: 69   Resp: 19   Temp: 98.2 °F (36.8 °C)   TempSrc: Oral    SpO2: 98%   Weight: 51.1 kg (112 lb 9.6 oz)   Height: 5' (1.524 m)      Physical Exam  Constitutional:       Appearance: Normal appearance.   HENT:      Head: Normocephalic.   Eyes:      Extraocular Movements: Extraocular movements intact.   Cardiovascular:      Rate and Rhythm: Normal rate and regular rhythm.      Pulses: Normal pulses.      Heart sounds: Normal heart sounds.   Pulmonary:      Effort: Pulmonary effort is normal.      Breath sounds: Normal breath sounds.   Skin:     General: Skin is warm and dry.      Capillary Refill: Capillary refill takes less than 2 seconds.   Neurological:      General: No focal deficit present.      Mental Status: She is alert and oriented to person, place, and time.   Psychiatric:         Mood and Affect: Mood normal.         Behavior: Behavior normal.          Assessment and Plan (including Health Maintenance)      Problem List  Smart Sets  Document Outside HM   :    Plan:     There are no Patient Instructions on file for this visit.       Health Maintenance Due   Topic Date Due    Hepatitis C Screening  Never done    Pneumococcal Vaccines (Age 0-64) (1 of 2 - PCV) Never done    TETANUS VACCINE  Never done    Influenza Vaccine (1) 09/01/2024    COVID-19 Vaccine (1 - 2023-24 season) Never done    Pap Smear  10/10/2024       Problem List Items Addressed This Visit       Anxiety    Current Assessment & Plan     Increase Prozac to 40mg daily. Instructed to inform provider if increase dose does not work. Pt vu.          Relevant Medications    FLUoxetine (PROZAC) 40 MG capsule    Depression    Current Assessment & Plan     Increase Prozac to 40mg daily. Instructed to inform provider if increase dose does not work. Pt vu.          Encounter for general adult medical examination without abnormal findings - Primary    Current Assessment & Plan     Attend regularly scheduled office visits.  Monitor/keep log of BP outside of clinic.   Take medications as prescribed.   Avoid adding  table salt to foods.   Recommend regular physical activity 30 min most days of the week.           Relevant Orders    CBC Auto Differential    Comprehensive Metabolic Panel    Vitamin D    Lipid Panel    TSH    Hemoglobin A1C    Fatigue    Current Assessment & Plan     Labs pending. Will inform of results when available.          Relevant Orders    Vitamin B12    Screening for diabetes mellitus    Current Assessment & Plan     Labs pending. Will inform of results when available.          Relevant Orders    Hemoglobin A1C    Screening for lipid disorders    Current Assessment & Plan     Labs pending. Will inform of results when available.          Relevant Orders    Lipid Panel     Encounter for general adult medical examination without abnormal findings  -     CBC Auto Differential; Future; Expected date: 09/13/2024  -     Comprehensive Metabolic Panel; Future; Expected date: 09/13/2024  -     Vitamin D; Future; Expected date: 09/13/2024  -     Lipid Panel; Future; Expected date: 09/13/2024  -     TSH; Future; Expected date: 09/13/2024  -     Hemoglobin A1C; Future; Expected date: 09/13/2024    Screening for lipid disorders  -     Lipid Panel; Future; Expected date: 09/13/2024    Screening for diabetes mellitus  -     Hemoglobin A1C; Future; Expected date: 09/13/2024    Fatigue, unspecified type  -     Vitamin B12; Future; Expected date: 09/13/2024    Anxiety  -     FLUoxetine (PROZAC) 40 MG capsule; Take 1 capsule (40 mg total) by mouth once daily.  Dispense: 90 capsule; Refill: 3    Current mild episode of major depressive disorder, unspecified whether recurrent       The patient has no Health Maintenance topics of status Not Due      Future Appointments   Date Time Provider Department Center   12/20/2024  1:40 PM Ekta Yuen FNP [facility]   9/15/2025  3:00 PM [facility]        Follow up in about 1 year (around 9/13/2025), or if symptoms worsen or fail to improve.     Health Maintenance Due   Topic Date Due    Hepatitis C Screening  Never done    Pneumococcal Vaccines (Age 0-64) (1 of 2 - PCV) Never done    TETANUS VACCINE  Never done    Influenza Vaccine (1) 09/01/2024    COVID-19 Vaccine (1 - 2023-24 season) Never done    Pap Smear  10/10/2024        Signature:  LOUISA Elaine    Date of encounter: 9/13/24

## 2024-09-13 NOTE — ASSESSMENT & PLAN NOTE
Increase Prozac to 40mg daily. Instructed to inform provider if increase dose does not work. Pt edson.

## 2024-10-24 LAB — PAP RECOMMENDATION EXT: NORMAL

## 2024-12-30 ENCOUNTER — OFFICE VISIT (OUTPATIENT)
Dept: FAMILY MEDICINE | Facility: CLINIC | Age: 29
End: 2024-12-30
Payer: MEDICAID

## 2024-12-30 VITALS
BODY MASS INDEX: 22.54 KG/M2 | WEIGHT: 114.81 LBS | HEIGHT: 60 IN | HEART RATE: 79 BPM | DIASTOLIC BLOOD PRESSURE: 68 MMHG | TEMPERATURE: 98 F | OXYGEN SATURATION: 97 % | SYSTOLIC BLOOD PRESSURE: 102 MMHG | RESPIRATION RATE: 19 BRPM

## 2024-12-30 DIAGNOSIS — F41.9 ANXIETY: ICD-10-CM

## 2024-12-30 PROCEDURE — 1160F RVW MEDS BY RX/DR IN RCRD: CPT | Mod: CPTII,,, | Performed by: NURSE PRACTITIONER

## 2024-12-30 PROCEDURE — 1159F MED LIST DOCD IN RCRD: CPT | Mod: CPTII,,, | Performed by: NURSE PRACTITIONER

## 2024-12-30 PROCEDURE — 3074F SYST BP LT 130 MM HG: CPT | Mod: CPTII,,, | Performed by: NURSE PRACTITIONER

## 2024-12-30 PROCEDURE — 99213 OFFICE O/P EST LOW 20 MIN: CPT | Mod: ,,, | Performed by: NURSE PRACTITIONER

## 2024-12-30 PROCEDURE — 3078F DIAST BP <80 MM HG: CPT | Mod: CPTII,,, | Performed by: NURSE PRACTITIONER

## 2024-12-30 PROCEDURE — 3044F HG A1C LEVEL LT 7.0%: CPT | Mod: CPTII,,, | Performed by: NURSE PRACTITIONER

## 2024-12-30 PROCEDURE — 3008F BODY MASS INDEX DOCD: CPT | Mod: CPTII,,, | Performed by: NURSE PRACTITIONER

## 2024-12-30 RX ORDER — FLUOXETINE HYDROCHLORIDE 40 MG/1
40 CAPSULE ORAL 2 TIMES DAILY
Qty: 180 CAPSULE | Refills: 3 | Status: SHIPPED | OUTPATIENT
Start: 2024-12-30 | End: 2025-12-30

## 2024-12-31 NOTE — PROGRESS NOTES
LOUISA Elaine   [facility]  [address]  [place] [address]     PATIENT NAME: Lianne Severino  : 1995  DATE: 24  MRN: [id number]      Billing Provider: LOUISA Elaine  Level of Service:   Patient PCP Information       Provider PCP Type    Primary Doctor No General            Reason for Visit / Chief Complaint: Anxiety (Pt is here for her 6 month follow up she will need refills on her prozac.)   Health Maintenance Due   Topic Date Due    Hepatitis C Screening  Never done    TETANUS VACCINE  Never done    Pneumococcal Vaccines (Age 0-49) (1 of 2 - PCV) Never done    COVID-19 Vaccine (2024-25 season) Never done    Pap Smear  10/10/2024          Update PCP  Update Chief Complaint         History of Present Illness / Problem Focused Workflow     History of Present Illness    CHIEF COMPLAINT:  Patient presents today for medication management.    MEDICATIONS:  She currently takes Prozac 40 mg with potential to increase up to 80 mg.      ROS:  General: -fever, -chills, -fatigue, -weight gain, -weight loss  Eyes: -vision changes, -redness, -discharge  ENT: -ear pain, -nasal congestion, -sore throat  Cardiovascular: -chest pain, -palpitations, -lower extremity edema  Respiratory: -cough, -shortness of breath  Gastrointestinal: -abdominal pain, -nausea, -vomiting, -diarrhea, -constipation, -blood in stool  Genitourinary: -dysuria, -hematuria, -frequency  Musculoskeletal: -joint pain, -muscle pain  Skin: -rash, -lesion  Neurological: -headache, -dizziness, -numbness, -tingling  Psychiatric: +anxiety, -depression, -sleep difficulty          Hemoglobin A1C   Date Value Ref Range Status   2024 5.0 4.5 - 6.6 % Final     Comment:       Normal:               <5.7%  Pre-Diabetic:       5.7% to 6.4%  Diabetic:             >6.4%  Diabetic Goal:     <7%        CMP  Sodium   Date Value Ref Range Status   2024 138 136 - 145 mmol/L Final     Potassium   Date Value Ref Range Status    09/13/2024 3.5 3.5 - 5.1 mmol/L Final     Chloride   Date Value Ref Range Status   09/13/2024 105 98 - 107 mmol/L Final     CO2   Date Value Ref Range Status   09/13/2024 26 21 - 32 mmol/L Final     Glucose   Date Value Ref Range Status   09/13/2024 129 (H) 74 - 106 mg/dL Final     BUN   Date Value Ref Range Status   09/13/2024 15 7 - 18 mg/dL Final     Creatinine   Date Value Ref Range Status   09/13/2024 0.77 0.55 - 1.02 mg/dL Final     Calcium   Date Value Ref Range Status   09/13/2024 9.3 8.5 - 10.1 mg/dL Final     Total Protein   Date Value Ref Range Status   09/13/2024 7.5 6.4 - 8.2 g/dL Final     Albumin   Date Value Ref Range Status   09/13/2024 4.3 3.5 - 5.0 g/dL Final     Bilirubin, Total   Date Value Ref Range Status   09/13/2024 0.5 >0.0 - 1.2 mg/dL Final     Alk Phos   Date Value Ref Range Status   09/13/2024 68 37 - 98 U/L Final     AST   Date Value Ref Range Status   09/13/2024 18 15 - 37 U/L Final     ALT   Date Value Ref Range Status   09/13/2024 30 13 - 56 U/L Final     Anion Gap   Date Value Ref Range Status   09/13/2024 11 7 - 16 mmol/L Final     eGFR   Date Value Ref Range Status   09/13/2024 109 >=60 mL/min/1.73m2 Final        Lab Results   Component Value Date    WBC 7.51 09/13/2024    RBC 4.48 09/13/2024    HGB 14.4 09/13/2024    HCT 42.8 09/13/2024    MCV 95.5 09/13/2024    MCH 32.1 (H) 09/13/2024    MCHC 33.6 09/13/2024    RDW 11.3 (L) 09/13/2024     09/13/2024    MPV 9.5 09/13/2024    LYMPH 26.6 (L) 09/13/2024    LYMPH 2.00 09/13/2024    MONO 3.5 09/13/2024    EOS 0.06 09/13/2024    BASO 0.03 09/13/2024    EOSINOPHIL 0.8 (L) 09/13/2024    BASOPHIL 0.4 09/13/2024        Lab Results   Component Value Date    CHOL 159 09/13/2024    CHOL 156 08/23/2023     Lab Results   Component Value Date    HDL 77 (H) 09/13/2024    HDL 63 (H) 08/23/2023     Lab Results   Component Value Date    LDLCALC 62 09/13/2024    LDLCALC 77 08/23/2023     Lab Results   Component Value Date    TRIG 102  09/13/2024    TRIG 79 08/23/2023     Lab Results   Component Value Date    CHOLHDL 2.1 09/13/2024    CHOLHDL 2.5 08/23/2023        Wt Readings from Last 3 Encounters:   12/30/24 1412 52.1 kg (114 lb 12.8 oz)   09/13/24 1514 51.1 kg (112 lb 9.6 oz)   06/21/24 1335 51.3 kg (113 lb)        BP Readings from Last 3 Encounters:   12/30/24 102/68   09/13/24 115/80   06/21/24 107/69        Review of Systems     Review of Systems       Medical / Social / Family History     Past Medical History:   Diagnosis Date    Depression     Foot injury, left, initial encounter 11/14/2023    Left foot pain 11/14/2023    She fell off of the porch and wristed the top of her foot whens he fell.   Xray ordered. Pending results.   Rest, Ice, Compression, Elevation  IBPROFEN 800mg as needed  Wrapped foot for compression with ace wrap     Middle ear effusion, bilateral 01/18/2024    Swollen lymph nodes 01/18/2024    Vaginal candidiasis 01/18/2024       Past Surgical History:   Procedure Laterality Date    EXCISION OF LESION OF EYELID      stye removal       Social History  Ms.  reports that she has been smoking vaping with nicotine. She has never been exposed to tobacco smoke. She has never used smokeless tobacco. She reports that she does not currently use alcohol after a past usage of about 1.0 standard drink of alcohol per week. She reports that she does not use drugs.    Family History  Ms.'s family history includes COPD in her maternal grandmother; Heart attack in her maternal uncle; Hypertension in her father and mother; Lung cancer in her maternal grandfather and paternal grandfather; No Known Problems in her paternal grandmother.    Medications and Allergies     Medications  Outpatient Medications Marked as Taking for the 12/30/24 encounter (Office Visit) with Ekta Yuen FNP   Medication Sig Dispense Refill    [DISCONTINUED] FLUoxetine (PROZAC) 40 MG capsule Take 1 capsule (40 mg total) by mouth once daily. 90 capsule 3        Allergies  Review of patient's allergies indicates:   Allergen Reactions    Clindamycin Nausea And Vomiting       Physical Examination     Vitals:    12/30/24 1412   BP: 102/68   BP Location: Right arm   Patient Position: Sitting   Pulse: 79   Resp: 19   Temp: 98.2 °F (36.8 °C)   TempSrc: Oral   SpO2: 97%   Weight: 52.1 kg (114 lb 12.8 oz)   Height: 5' (1.524 m)      Physical Exam  Constitutional:       Appearance: Normal appearance.   HENT:      Head: Normocephalic.   Eyes:      Extraocular Movements: Extraocular movements intact.   Cardiovascular:      Rate and Rhythm: Normal rate and regular rhythm.      Pulses: Normal pulses.      Heart sounds: Normal heart sounds.   Pulmonary:      Effort: Pulmonary effort is normal.      Breath sounds: Normal breath sounds.   Skin:     General: Skin is warm and dry.      Capillary Refill: Capillary refill takes less than 2 seconds.   Neurological:      General: No focal deficit present.      Mental Status: She is alert and oriented to person, place, and time.   Psychiatric:         Mood and Affect: Mood is anxious.         Behavior: Behavior normal.          Assessment and Plan (including Health Maintenance)      Problem List  Smart Sets  Document Outside HM   :    Plan:     There are no Patient Instructions on file for this visit.       Health Maintenance Due   Topic Date Due    Hepatitis C Screening  Never done    TETANUS VACCINE  Never done    Pneumococcal Vaccines (Age 0-49) (1 of 2 - PCV) Never done    COVID-19 Vaccine (1 - 2024-25 season) Never done    Pap Smear  10/10/2024       Problem List Items Addressed This Visit       Anxiety    Relevant Medications    FLUoxetine (PROZAC) 40 MG capsule     Assessment & Plan    IMPRESSION:  - Considered increasing Prozac dosage to maximum of 80mg daily, split into two 40mg doses, to address patient's recent symptoms  - Reviewed recent lab results from September, deemed current    DEPRESSION:  - Evaluated the patient's current  state on medication and discussed recent difficulties with depression symptoms.  - Reviewed recent lab results and assessed the effectiveness of the current medication regimen.  - Increased Prozac dosage to 80mg daily, to be taken as 40mg twice daily.  - Prescribed a 90-day supply with refills for approximately 1 year.  - Sent prescription to [facility].    FOLLOW UP:  - Scheduled follow-up visit in 1 year (September).  - Instructed the patient to contact the office if the current medication regimen is not effective or if other issues arise before the next scheduled appointment.        Anxiety  -     FLUoxetine (PROZAC) 40 MG capsule; Take 1 capsule (40 mg total) by mouth 2 (two) times daily.  Dispense: 180 capsule; Refill: 3       Health Maintenance Topics with due status: Not Due       Topic Last Completion Date    RSV Vaccine (Age 60+ and Pregnant patients) Not Due         Future Appointments   Date Time Provider Department Center   9/15/2025  3:00 PM Ekta Yuen FNP [facility]        No follow-ups on file.    Health Maintenance Due   Topic Date Due    Hepatitis C Screening  Never done    TETANUS VACCINE  Never done    Pneumococcal Vaccines (Age 0-49) (1 of 2 - PCV) Never done    COVID-19 Vaccine (1 - 2024-25 season) Never done    Pap Smear  10/10/2024        Signature:  LOUISA Elaine    Date of encounter: 12/30/24  This note was generated with the assistance of ambient listening technology. Verbal consent was obtained by the patient and accompanying visitor(s) for the recording of patient appointment to facilitate this note. I attest to having reviewed and edited the generated note for accuracy, though some syntax or spelling errors may persist. Please contact the author of this note for any clarification.

## 2025-01-24 ENCOUNTER — OFFICE VISIT (OUTPATIENT)
Dept: FAMILY MEDICINE | Facility: CLINIC | Age: 30
End: 2025-01-24
Payer: MEDICAID

## 2025-01-24 ENCOUNTER — APPOINTMENT (OUTPATIENT)
Dept: RADIOLOGY | Facility: CLINIC | Age: 30
End: 2025-01-24
Attending: NURSE PRACTITIONER
Payer: MEDICAID

## 2025-01-24 VITALS
OXYGEN SATURATION: 100 % | HEART RATE: 71 BPM | WEIGHT: 110.88 LBS | BODY MASS INDEX: 21.77 KG/M2 | HEIGHT: 60 IN | TEMPERATURE: 99 F | DIASTOLIC BLOOD PRESSURE: 73 MMHG | SYSTOLIC BLOOD PRESSURE: 111 MMHG | RESPIRATION RATE: 20 BRPM

## 2025-01-24 DIAGNOSIS — R05.3 PERSISTENT COUGH: Primary | ICD-10-CM

## 2025-01-24 DIAGNOSIS — R05.9 COUGH, UNSPECIFIED TYPE: ICD-10-CM

## 2025-01-24 DIAGNOSIS — R09.81 SINUS CONGESTION: ICD-10-CM

## 2025-01-24 LAB
CTP QC/QA: YES
MOLECULAR STREP A: NEGATIVE
POC MOLECULAR INFLUENZA A AGN: NEGATIVE
POC MOLECULAR INFLUENZA B AGN: NEGATIVE
SARS-COV-2 RDRP RESP QL NAA+PROBE: NEGATIVE

## 2025-01-24 PROCEDURE — 3078F DIAST BP <80 MM HG: CPT | Mod: CPTII,,, | Performed by: NURSE PRACTITIONER

## 2025-01-24 PROCEDURE — 1160F RVW MEDS BY RX/DR IN RCRD: CPT | Mod: CPTII,,, | Performed by: NURSE PRACTITIONER

## 2025-01-24 PROCEDURE — 71045 X-RAY EXAM CHEST 1 VIEW: CPT | Mod: TC,RHCUB | Performed by: NURSE PRACTITIONER

## 2025-01-24 PROCEDURE — 87635 SARS-COV-2 COVID-19 AMP PRB: CPT | Mod: RHCUB | Performed by: NURSE PRACTITIONER

## 2025-01-24 PROCEDURE — 1159F MED LIST DOCD IN RCRD: CPT | Mod: CPTII,,, | Performed by: NURSE PRACTITIONER

## 2025-01-24 PROCEDURE — 3074F SYST BP LT 130 MM HG: CPT | Mod: CPTII,,, | Performed by: NURSE PRACTITIONER

## 2025-01-24 PROCEDURE — 99214 OFFICE O/P EST MOD 30 MIN: CPT | Mod: ,,, | Performed by: NURSE PRACTITIONER

## 2025-01-24 PROCEDURE — 3008F BODY MASS INDEX DOCD: CPT | Mod: CPTII,,, | Performed by: NURSE PRACTITIONER

## 2025-01-24 PROCEDURE — 87651 STREP A DNA AMP PROBE: CPT | Mod: RHCUB | Performed by: NURSE PRACTITIONER

## 2025-01-24 PROCEDURE — 87502 INFLUENZA DNA AMP PROBE: CPT | Mod: RHCUB | Performed by: NURSE PRACTITIONER

## 2025-01-24 RX ORDER — BENZONATATE 100 MG/1
CAPSULE ORAL
Qty: 30 CAPSULE | Refills: 0 | Status: SHIPPED | OUTPATIENT
Start: 2025-01-24 | End: 2025-02-24

## 2025-01-24 NOTE — PROGRESS NOTES
[facility]    Dianna Watt, AGPCNP-BC [facility]  [address] [place] [address]  [phone]          PATIENT NAME: Lianne Severino  : 1995  DATE: 25  MRN: [id number]          Reason for Visit        Chief Complaint   Patient presents with    Cough     Nasal congestion, sore throat about 1 week, daughter tested positive on , just finished z-cheyanne and a decadron shot on Monday, and yesterday decadron and rochphine       History of Present Illness      CHIEF COMPLAINT:  Patient presents today for persistent cough and respiratory symptoms.    HISTORY OF PRESENT ILLNESS:  She experienced ear pain and headaches which have since improved significantly. She visited Urgent Care on Monday the  where she received a decadron injection. She previously took Tamiflu but discontinued after 5 days due to adverse behavioral side effects.    LABS:  All swabs were negative for flu, strep, and COVID.    ALLERGIES:  She has an allergy to Clindamycin.      ROS:  General: -fever, -chills, -fatigue, -weight gain, -weight loss  Eyes: -vision changes, -redness, -discharge  ENT: -ear pain, -nasal congestion, -sore throat  Cardiovascular: -chest pain, -palpitations, -lower extremity edema  Respiratory: +cough, -shortness of breath  Gastrointestinal: -abdominal pain, -nausea, -vomiting, -diarrhea, -constipation, -blood in stool  Genitourinary: -dysuria, -hematuria, -frequency  Musculoskeletal: -joint pain, -muscle pain  Skin: -rash, -lesion  Neurological: -headache, -dizziness, -numbness, -tingling  Psychiatric: -anxiety, -depression, -sleep difficulty          MEDICAL / SURGICAL / SOCIAL HISTORY     Past Medical History:   Diagnosis Date    Depression     Foot injury, left, initial encounter 2023    Left foot pain 2023    She fell off of the porch and wristed the top of her foot whens he fell.   Xray ordered. Pending  results.   Rest, Ice, Compression, Elevation  IBPROFEN 800mg as needed  Wrapped foot for compression with ace wrap     Middle ear effusion, bilateral 01/18/2024    Swollen lymph nodes 01/18/2024    Vaginal candidiasis 01/18/2024       Past Surgical History:   Procedure Laterality Date    EXCISION OF LESION OF EYELID      stye removal       Social History     Tobacco Use    Smoking status: Every Day     Types: Vaping with nicotine     Passive exposure: Never    Smokeless tobacco: Never   Substance Use Topics    Alcohol use: Not Currently     Alcohol/week: 1.0 standard drink of alcohol     Types: 1 Glasses of wine per week     Comment: rarely    Drug use: Never         I personally reviewed all past medical, surgical, and social.     MEDICATIONS / ALLERGIES / HM     Current Outpatient Medications   Medication Sig Dispense Refill    benzonatate (TESSALON) 100 MG capsule Take 1-2 capsules three times daily as needed for cough. 30 capsule 0    FLUoxetine (PROZAC) 40 MG capsule Take 1 capsule (40 mg total) by mouth 2 (two) times daily. 180 capsule 3     No current facility-administered medications for this visit.       Review of patient's allergies indicates:   Allergen Reactions    Clindamycin Nausea And Vomiting         There is no immunization history on file for this patient.     Health Maintenance   Topic Date Due    Hepatitis C Screening  Never done    TETANUS VACCINE  Never done    Pneumococcal Vaccines (Age 0-49) (1 of 2 - PCV) Never done    Influenza Vaccine (1) 09/01/2024    COVID-19 Vaccine (1 - 2024-25 season) Never done    Pap Smear  10/10/2024    RSV Vaccine (Age 60+ and Pregnant patients) (1 - 1-dose 75+ series) 05/28/2072    HIV Screening  Completed    Lipid Panel  Completed        Physical Exam      Vital Signs  Temp: 98.8 °F (37.1 °C)  Temp Source: Oral  Pulse: 71  Resp: 20  SpO2: 100 %  BP: 111/73  BP Location: Right arm  Patient Position: Sitting  Pain Score:   3  Pain Loc: Generalized  Height and  Weight  Height: 5' (152.4 cm)  Weight: 50.3 kg (110 lb 14.4 oz)  BSA (Calculated - sq m): 1.46 sq meters  BMI (Calculated): 21.7  Weight in (lb) to have BMI = 25: 127.7]    Physical Exam  Constitutional:       General: She is not in acute distress.     Appearance: She is ill-appearing.   HENT:      Head: Normocephalic.      Right Ear: Tympanic membrane, ear canal and external ear normal. There is no impacted cerumen.      Left Ear: Tympanic membrane, ear canal and external ear normal. There is no impacted cerumen.      Nose: Congestion present.      Mouth/Throat:      Mouth: Mucous membranes are moist.      Pharynx: Posterior oropharyngeal erythema and postnasal drip present.      Comments: Cobble stoning present.   Cardiovascular:      Rate and Rhythm: Normal rate and regular rhythm.      Pulses: Normal pulses.      Heart sounds: Normal heart sounds.   Pulmonary:      Effort: Pulmonary effort is normal.      Breath sounds: Wheezing (expiratory, RUL, RLL posterior) present.   Abdominal:      Palpations: Abdomen is soft.      Tenderness: There is no abdominal tenderness.   Skin:     General: Skin is warm and dry.   Neurological:      Mental Status: She is alert and oriented to person, place, and time.   Psychiatric:         Mood and Affect: Mood normal.         Behavior: Behavior normal.          Laboratory:    Lab Results   Component Value Date     (H) 09/13/2024     09/13/2024    K 3.5 09/13/2024     09/13/2024    CO2 26 09/13/2024    BUN 15 09/13/2024    CREATININE 0.77 09/13/2024    CALCIUM 9.3 09/13/2024    PROT 7.5 09/13/2024    ALBUMIN 4.3 09/13/2024    BILITOT 0.5 09/13/2024    ALKPHOS 68 09/13/2024    AST 18 09/13/2024    ALT 30 09/13/2024    ANIONGAP 11 09/13/2024       Lab Results   Component Value Date    WBC 7.51 09/13/2024    RBC 4.48 09/13/2024    HGB 14.4 09/13/2024    HCT 42.8 09/13/2024    MCV 95.5 09/13/2024    RDW 11.3 (L) 09/13/2024     09/13/2024        Lab Results  "  Component Value Date    CHOL 159 09/13/2024    TRIG 102 09/13/2024    HDL 77 (H) 09/13/2024    LDLCALC 62 09/13/2024       Lab Results   Component Value Date    TSH 0.611 09/13/2024       Lab Results   Component Value Date    HGBA1C 5.0 09/13/2024    ESTIMATEDAVG 97 09/13/2024        Lab Results   Component Value Date    DACBYXXH86 941 09/13/2024       Lab Results   Component Value Date    IRNMOMLD99LP 38.8 09/13/2024       Point Of Care Testing:    No results found for: "WBCUR", "NITRITE", "UROBILINOGEN", "PROTEINPOC", "PHUR", "BLOODUR", "SPECGRAV", "KETONESU", "BILIRUBINPOC", "GLUCOSEUR"    Lab Results   Component Value Date    RPY37GPEFVSO Negative 01/24/2025    FLUAMOLEC Negative 01/24/2025    FLUBMOLEC Negative 01/24/2025    MOLSTREPAPOC Negative 01/24/2025       Assessment/Plan     Persistent cough  -     POCT COVID-19 Rapid Screening-negative  -     POCT Strep A, Molecular-negative  -     POCT Influenza A/B Molecular-negative  -     X-Ray Chest PA And Lateral; Future; Expected date: 01/24/2025-unable to perform due to equipment issue  -     X-Ray Chest 1 View; formal report pending  -     benzonatate (TESSALON) 100 MG capsule; Take 1-2 capsules three times daily as needed for cough.  Dispense: 30 capsule; Refill: 0    Sinus congestion  -     X-Ray Sinuses 3 or more views; Future; Expected date: 01/24/2025 unable to perform due to equipment issue        Assessment & Plan    IMPRESSION:  - Assessed patient with persistent cough and respiratory symptoms despite negative swabs for flu and COVID  - Considered diagnosis of bronchitis  - Awaiting results of single view chest XR for further evaluation    BRONCHITIS:  - Explained that bronchitis can persist for 4-6 weeks due to inflammation.  - Noted that the patient has been coughing and experiencing symptoms at least one week.  - Observed wheezing on physical exam.  - Identified cough as the most aggravating symptom reported by the patient.  - Discussed that " nighttime coughing may be caused by postnasal drip.  - Advised elevating the head of the bed to prevent fluid pooling in throat and reduce nighttime coughing.  - Noted that the patient received decadron & Rocephin injection at Urgent Care.    COUGH MANAGEMENT:  - Prescribed Tessalon pearls for cough, 100mg capsules, 1-2 every 8 hours as needed.  - Instructed the patient to take 1 capsule during daytime to minimize drowsiness.  - Recommend taking 2 capsules at bedtime for nighttime cough relief.  - Advised elevating the head of the bed to prevent nighttime coughing.    GENERAL HEALTH RECOMMENDATIONS:  - Recommend increasing fluid intake and vitamin C supplementation.  - Patient to increase fluid intake (water, Gatorade).  - Recommend getting some form of vitamin C.    CHEST X-RAY:  - Performed single view chest XR; results pending.  - Will notify the patient of chest XR results via patient portal.    ALLERGIES:  - Documented patient's reported allergy to Clindamycin.    FOLLOW UP:  - Instructed the patient to follow up with PCP if symptoms persist, worsen, or new symptoms develop         Future Appointments   Date Time Provider Department Center   9/15/2025  3:00 PM Ekta Yuen FNP [facility]       Workup results were reviewed and all questions were answered. Diagnosis and treatment options were discussed and the patient  is amenable with the overall treatment plan. Verbal and written discharge instructions were given including to return to clinic/ED with any acute worsening of symptoms or failure of symptoms to improve. The reasons for return to the clinic/ED were explained in lay terms. No further intervention is warranted at this time. The patient agrees with the plan, expresses understanding, is hemodynamically stable and in no acute distress.     All questions answered to desired level of satisfaction    This note was generated with the assistance of ambient listening technology. Verbal consent  was obtained by the patient and accompanying visitor(s) for the recording of patient appointment to facilitate this note. I attest to having reviewed and edited the generated note for accuracy, though some syntax or spelling errors may persist. Please contact the author of this note for any clarification.             ROCKY Garcia-BC [facility]

## 2025-07-30 DIAGNOSIS — F41.9 ANXIETY: ICD-10-CM

## 2025-07-30 RX ORDER — FLUOXETINE HYDROCHLORIDE 40 MG/1
40 CAPSULE ORAL 2 TIMES DAILY
Qty: 60 CAPSULE | Refills: 0 | Status: SHIPPED | OUTPATIENT
Start: 2025-07-30 | End: 2026-07-30

## 2025-07-30 NOTE — TELEPHONE ENCOUNTER
Copied from CRM #[id number]. Topic: Medications - New Medication Request  >> Jul 30, 2025  8:50 AM James Westfall wrote:  Who Called: Lianne Severino    Refill or New Rx:New Rx  RX Name and Strength:FLUoxetine (PROZAC) 40 MG capsule  How is the patient currently taking it?  Take 1 capsule (40 mg total) by mouth 2 (two) times daily  Is this a 30 day or 90 day RX:180  Local or Mail Order:Local   [facility] [place] - [address]    Ordering Provider: Ekta Yuen FNP      Preferred Method of Contact: Phone Call  Patient's Preferred Phone Number on File: :[phone]  Best Call Back Number, if different:   Additional Information: Patient only has 6 tablets left and will be out before her appt, wanted to know if it could be called in before her appt date. Patient also wanted to know if she had to do fasting labs before her appt.     Fri Aug 08 at 4:00 PM  EP - PRIMARY CARE ([facility])  20 minutes  [facility]  Ekta Yuen FNP

## 2025-08-08 ENCOUNTER — OFFICE VISIT (OUTPATIENT)
Dept: FAMILY MEDICINE | Facility: CLINIC | Age: 30
End: 2025-08-08
Payer: MEDICAID

## 2025-08-08 VITALS
OXYGEN SATURATION: 99 % | RESPIRATION RATE: 19 BRPM | DIASTOLIC BLOOD PRESSURE: 77 MMHG | WEIGHT: 116 LBS | HEIGHT: 60 IN | BODY MASS INDEX: 22.78 KG/M2 | TEMPERATURE: 98 F | SYSTOLIC BLOOD PRESSURE: 122 MMHG | HEART RATE: 94 BPM

## 2025-08-08 DIAGNOSIS — Z13.1 SCREENING FOR DIABETES MELLITUS: ICD-10-CM

## 2025-08-08 DIAGNOSIS — E55.9 VITAMIN D DEFICIENCY: ICD-10-CM

## 2025-08-08 DIAGNOSIS — F41.9 ANXIETY: Primary | ICD-10-CM

## 2025-08-08 DIAGNOSIS — Z13.220 SCREENING FOR LIPID DISORDERS: ICD-10-CM

## 2025-08-08 LAB
25(OH)D3 SERPL-MCNC: 88.4 NG/ML (ref 30–80)
ALBUMIN SERPL BCP-MCNC: 4.2 G/DL (ref 3.5–5)
ALBUMIN/GLOB SERPL: 1.4 {RATIO}
ALP SERPL-CCNC: 64 U/L (ref 40–150)
ALT SERPL W P-5'-P-CCNC: 20 U/L
ANION GAP SERPL CALCULATED.3IONS-SCNC: 9 MMOL/L (ref 7–16)
AST SERPL W P-5'-P-CCNC: 33 U/L (ref 11–45)
BASOPHILS # BLD AUTO: 0.02 K/UL (ref 0–0.2)
BASOPHILS NFR BLD AUTO: 0.3 % (ref 0–1)
BILIRUB SERPL-MCNC: 0.4 MG/DL
BUN SERPL-MCNC: 14 MG/DL (ref 7–19)
BUN/CREAT SERPL: 18 (ref 6–20)
CALCIUM SERPL-MCNC: 9.5 MG/DL (ref 8.4–10.2)
CHLORIDE SERPL-SCNC: 100 MMOL/L (ref 98–107)
CHOLEST SERPL-MCNC: 149 MG/DL
CHOLEST/HDLC SERPL: 2 {RATIO}
CO2 SERPL-SCNC: 28 MMOL/L (ref 22–29)
CREAT SERPL-MCNC: 0.76 MG/DL (ref 0.55–1.02)
DIFFERENTIAL METHOD BLD: ABNORMAL
EGFR (NO RACE VARIABLE) (RUSH/TITUS): 110 ML/MIN/1.73M2
EOSINOPHIL # BLD AUTO: 0.05 K/UL (ref 0–0.5)
EOSINOPHIL NFR BLD AUTO: 0.7 % (ref 1–4)
ERYTHROCYTE [DISTWIDTH] IN BLOOD BY AUTOMATED COUNT: 11.2 % (ref 11.5–14.5)
EST. AVERAGE GLUCOSE BLD GHB EST-MCNC: 94 MG/DL
GLOBULIN SER-MCNC: 3 G/DL (ref 2–4)
GLUCOSE SERPL-MCNC: 146 MG/DL (ref 74–100)
HBA1C MFR BLD HPLC: 4.9 %
HCT VFR BLD AUTO: 40.6 % (ref 38–47)
HDLC SERPL-MCNC: 75 MG/DL (ref 35–60)
HGB BLD-MCNC: 13.8 G/DL (ref 12–16)
IMM GRANULOCYTES # BLD AUTO: 0.02 K/UL (ref 0–0.04)
IMM GRANULOCYTES NFR BLD: 0.3 % (ref 0–0.4)
LDLC SERPL CALC-MCNC: 64 MG/DL
LYMPHOCYTES # BLD AUTO: 1.7 K/UL (ref 1–4.8)
LYMPHOCYTES NFR BLD AUTO: 24.1 % (ref 27–41)
MCH RBC QN AUTO: 33.1 PG (ref 27–31)
MCHC RBC AUTO-ENTMCNC: 34 G/DL (ref 32–36)
MCV RBC AUTO: 97.4 FL (ref 80–96)
MONOCYTES # BLD AUTO: 0.25 K/UL (ref 0–0.8)
MONOCYTES NFR BLD AUTO: 3.6 % (ref 2–6)
MPC BLD CALC-MCNC: 9.6 FL (ref 9.4–12.4)
NEUTROPHILS # BLD AUTO: 5 K/UL (ref 1.8–7.7)
NEUTROPHILS NFR BLD AUTO: 71 % (ref 53–65)
NONHDLC SERPL-MCNC: 74 MG/DL
NRBC # BLD AUTO: 0 X10E3/UL
NRBC, AUTO (.00): 0 %
PLATELET # BLD AUTO: 310 K/UL (ref 150–400)
POTASSIUM SERPL-SCNC: 3.4 MMOL/L (ref 3.5–5.1)
PROT SERPL-MCNC: 7.2 G/DL (ref 6.4–8.3)
RBC # BLD AUTO: 4.17 M/UL (ref 4.2–5.4)
SODIUM SERPL-SCNC: 134 MMOL/L (ref 136–145)
TRIGL SERPL-MCNC: 49 MG/DL (ref 37–140)
VLDLC SERPL-MCNC: 10 MG/DL
WBC # BLD AUTO: 7.04 K/UL (ref 4.5–11)

## 2025-08-08 PROCEDURE — 3008F BODY MASS INDEX DOCD: CPT | Mod: CPTII,,, | Performed by: NURSE PRACTITIONER

## 2025-08-08 PROCEDURE — 3078F DIAST BP <80 MM HG: CPT | Mod: CPTII,,, | Performed by: NURSE PRACTITIONER

## 2025-08-08 PROCEDURE — 82306 VITAMIN D 25 HYDROXY: CPT | Mod: ,,, | Performed by: CLINICAL MEDICAL LABORATORY

## 2025-08-08 PROCEDURE — 3044F HG A1C LEVEL LT 7.0%: CPT | Mod: CPTII,,, | Performed by: NURSE PRACTITIONER

## 2025-08-08 PROCEDURE — 3074F SYST BP LT 130 MM HG: CPT | Mod: CPTII,,, | Performed by: NURSE PRACTITIONER

## 2025-08-08 PROCEDURE — 80061 LIPID PANEL: CPT | Mod: ,,, | Performed by: CLINICAL MEDICAL LABORATORY

## 2025-08-08 PROCEDURE — 83036 HEMOGLOBIN GLYCOSYLATED A1C: CPT | Mod: ,,, | Performed by: CLINICAL MEDICAL LABORATORY

## 2025-08-08 PROCEDURE — 1160F RVW MEDS BY RX/DR IN RCRD: CPT | Mod: CPTII,,, | Performed by: NURSE PRACTITIONER

## 2025-08-08 PROCEDURE — 80053 COMPREHEN METABOLIC PANEL: CPT | Mod: ,,, | Performed by: CLINICAL MEDICAL LABORATORY

## 2025-08-08 PROCEDURE — 1159F MED LIST DOCD IN RCRD: CPT | Mod: CPTII,,, | Performed by: NURSE PRACTITIONER

## 2025-08-08 PROCEDURE — 85025 COMPLETE CBC W/AUTO DIFF WBC: CPT | Mod: ,,, | Performed by: CLINICAL MEDICAL LABORATORY

## 2025-08-08 PROCEDURE — 99214 OFFICE O/P EST MOD 30 MIN: CPT | Mod: ,,, | Performed by: NURSE PRACTITIONER

## 2025-08-08 RX ORDER — FLUOXETINE HYDROCHLORIDE 40 MG/1
40 CAPSULE ORAL DAILY
Qty: 90 CAPSULE | Refills: 3 | Status: SHIPPED | OUTPATIENT
Start: 2025-08-08 | End: 2026-08-08

## 2025-08-10 PROBLEM — E55.9 VITAMIN D DEFICIENCY: Status: ACTIVE | Noted: 2025-08-10

## 2025-08-11 ENCOUNTER — PATIENT OUTREACH (OUTPATIENT)
Facility: HOSPITAL | Age: 30
End: 2025-08-11
Payer: MEDICAID

## 2025-08-13 ENCOUNTER — PATIENT OUTREACH (OUTPATIENT)
Facility: HOSPITAL | Age: 30
End: 2025-08-13
Payer: MEDICAID